# Patient Record
Sex: FEMALE | Race: WHITE | NOT HISPANIC OR LATINO
[De-identification: names, ages, dates, MRNs, and addresses within clinical notes are randomized per-mention and may not be internally consistent; named-entity substitution may affect disease eponyms.]

---

## 2018-07-19 ENCOUNTER — RESULT REVIEW (OUTPATIENT)
Age: 23
End: 2018-07-19

## 2019-08-01 ENCOUNTER — RESULT REVIEW (OUTPATIENT)
Age: 24
End: 2019-08-01

## 2020-12-03 PROBLEM — Z00.00 ENCOUNTER FOR PREVENTIVE HEALTH EXAMINATION: Status: ACTIVE | Noted: 2020-12-03

## 2020-12-21 ENCOUNTER — APPOINTMENT (OUTPATIENT)
Dept: ORTHOPEDIC SURGERY | Facility: CLINIC | Age: 25
End: 2020-12-21

## 2020-12-22 ENCOUNTER — APPOINTMENT (OUTPATIENT)
Dept: ORTHOPEDIC SURGERY | Facility: CLINIC | Age: 25
End: 2020-12-22
Payer: COMMERCIAL

## 2020-12-22 RX ORDER — CYCLOBENZAPRINE HYDROCHLORIDE 10 MG/1
10 TABLET, FILM COATED ORAL
Qty: 20 | Refills: 0 | Status: ACTIVE | COMMUNITY
Start: 2020-10-01

## 2020-12-22 RX ORDER — ETONOGESTREL AND ETHINYL ESTRADIOL 11.7; 2.7 MG/1; MG/1
0.12-0.015 INSERT, EXTENDED RELEASE VAGINAL
Qty: 3 | Refills: 0 | Status: ACTIVE | COMMUNITY
Start: 2020-11-12

## 2020-12-29 ENCOUNTER — APPOINTMENT (OUTPATIENT)
Dept: ORTHOPEDIC SURGERY | Facility: CLINIC | Age: 25
End: 2020-12-29
Payer: COMMERCIAL

## 2020-12-29 VITALS
WEIGHT: 108 LBS | SYSTOLIC BLOOD PRESSURE: 114 MMHG | DIASTOLIC BLOOD PRESSURE: 70 MMHG | HEART RATE: 103 BPM | HEIGHT: 62 IN | BODY MASS INDEX: 19.88 KG/M2

## 2020-12-29 VITALS — TEMPERATURE: 96 F

## 2020-12-29 DIAGNOSIS — M54.16 RADICULOPATHY, LUMBAR REGION: ICD-10-CM

## 2020-12-29 PROCEDURE — 99204 OFFICE O/P NEW MOD 45 MIN: CPT

## 2020-12-29 PROCEDURE — 99072 ADDL SUPL MATRL&STAF TM PHE: CPT

## 2020-12-29 RX ORDER — MELOXICAM 15 MG/1
15 TABLET ORAL
Qty: 30 | Refills: 1 | Status: ACTIVE | COMMUNITY
Start: 2020-12-29 | End: 1900-01-01

## 2021-10-15 ENCOUNTER — NON-APPOINTMENT (OUTPATIENT)
Age: 26
End: 2021-10-15

## 2021-10-15 ENCOUNTER — APPOINTMENT (OUTPATIENT)
Dept: ORTHOPEDIC SURGERY | Facility: CLINIC | Age: 26
End: 2021-10-15
Payer: COMMERCIAL

## 2021-10-15 VITALS — BODY MASS INDEX: 19.69 KG/M2 | HEIGHT: 62 IN | WEIGHT: 107 LBS

## 2021-10-15 DIAGNOSIS — Z78.9 OTHER SPECIFIED HEALTH STATUS: ICD-10-CM

## 2021-10-15 PROCEDURE — 99214 OFFICE O/P EST MOD 30 MIN: CPT

## 2021-10-15 NOTE — PHYSICAL EXAM
[de-identified] : General Exam\par \par Well developed, well nourished\par No apparent distress\par Oriented to person, place, and time\par Mood: Normal\par Affect: Normal\par Balance and coordination: Normal\par Gait: Normal\par \par Right hip exam\par \par Skin: Clean/dry and intact\par Inspection: No obvious deformity, no swelling, no ecchymosis.\par Tenderness:  no tenderness over greater trochanter/glut medius insertion. No tenderness pubic symphysis, pubic tubercle, hip flexors. No ttp ischial tuberosity or buttock. No ttp over the ASIS/Illiac crest.\par ROM: 0-120°. Internal rotation 30 external rotation 70\par Painful ROM: None\par Additional tests: No pain with circumduction positive impingement test at 90° mildly positive impingement test at 60° negative [facility]\par Strength: 5/5 hip flexion/ADD/ABD/Q/H/TA/GS/EHL\par Neuro: Sensation in tact to light touch throughout in dp/sp/tib/mega/saph distributions\par Pulses: 2+ DP/PT pulses\par  [de-identified] : Radiographs and MRI of the right hip obtained previously were reviewed.  There is a slight decrease in femoral head neck offset.  There is a focal detached tear of the anterior superior labrum

## 2021-10-15 NOTE — DISCUSSION/SUMMARY
[de-identified] : 26-year-old female with right hip femoral acetabular impingement and labral tear.  She is in extensive nonoperative treatment she continues.  Pain.  We discussed continued nonoperative treatment with activity modification physical therapy medications injections.  We discussed surgical treatment.  Right hip arthroscopy with labral repair possible femoroplasty.  We discussed the surgery postoperative protocol and restrictions at length.  Risks benefits alternatives discussed including but not limited to risks such as bleeding infection nerve and vessel injury continued pain stiffness need for future surgery medical complications such as DVT or PE and anesthesia complications all questions were answered and she will schedule at her convenience

## 2021-10-15 NOTE — HISTORY OF PRESENT ILLNESS
[de-identified] : 26-year-old female chief complaint of right hip pain for greater than 1 year she reports pain in her groin she tried a period of rest medications and physical therapy without relief she reports mechanical symptoms and popping within her joint she has pain with walking.  She is currently a medical student.  She has difficulty with standing for long periods of time because of pain in her groin\par \par The patient's past medical history, past surgical history, medications, allergies, and social history were reviewed by me today with the patient and documented accordingly. In addition, the patient's family history, which is noncontributory to this visit, was also reviewed.\par

## 2021-12-13 ENCOUNTER — TRANSCRIPTION ENCOUNTER (OUTPATIENT)
Age: 26
End: 2021-12-13

## 2021-12-13 ENCOUNTER — OUTPATIENT (OUTPATIENT)
Dept: OUTPATIENT SERVICES | Facility: HOSPITAL | Age: 26
LOS: 1 days | Discharge: HOME | End: 2021-12-13

## 2021-12-13 VITALS
SYSTOLIC BLOOD PRESSURE: 113 MMHG | HEIGHT: 62 IN | OXYGEN SATURATION: 98 % | TEMPERATURE: 98 F | DIASTOLIC BLOOD PRESSURE: 73 MMHG | HEART RATE: 83 BPM | WEIGHT: 108.47 LBS

## 2021-12-13 DIAGNOSIS — M25.851 OTHER SPECIFIED JOINT DISORDERS, RIGHT HIP: ICD-10-CM

## 2021-12-13 DIAGNOSIS — S73.191A OTHER SPRAIN OF RIGHT HIP, INITIAL ENCOUNTER: ICD-10-CM

## 2021-12-13 DIAGNOSIS — Z01.818 ENCOUNTER FOR OTHER PREPROCEDURAL EXAMINATION: ICD-10-CM

## 2021-12-13 LAB
ANION GAP SERPL CALC-SCNC: 6 MMOL/L — SIGNIFICANT CHANGE UP (ref 5–17)
BUN SERPL-MCNC: 8 MG/DL — SIGNIFICANT CHANGE UP (ref 7–23)
CALCIUM SERPL-MCNC: 9.2 MG/DL — SIGNIFICANT CHANGE UP (ref 8.5–10.1)
CHLORIDE SERPL-SCNC: 107 MMOL/L — SIGNIFICANT CHANGE UP (ref 96–108)
CO2 SERPL-SCNC: 30 MMOL/L — SIGNIFICANT CHANGE UP (ref 22–31)
CREAT SERPL-MCNC: 0.66 MG/DL — SIGNIFICANT CHANGE UP (ref 0.5–1.3)
GLUCOSE SERPL-MCNC: 81 MG/DL — SIGNIFICANT CHANGE UP (ref 70–99)
HCG UR QL: NEGATIVE — SIGNIFICANT CHANGE UP
HCT VFR BLD CALC: 42.7 % — SIGNIFICANT CHANGE UP (ref 34.5–45)
HGB BLD-MCNC: 13.9 G/DL — SIGNIFICANT CHANGE UP (ref 11.5–15.5)
MCHC RBC-ENTMCNC: 28.6 PG — SIGNIFICANT CHANGE UP (ref 27–34)
MCHC RBC-ENTMCNC: 32.6 GM/DL — SIGNIFICANT CHANGE UP (ref 32–36)
MCV RBC AUTO: 87.9 FL — SIGNIFICANT CHANGE UP (ref 80–100)
NRBC # BLD: 0 /100 WBCS — SIGNIFICANT CHANGE UP (ref 0–0)
PLATELET # BLD AUTO: 205 K/UL — SIGNIFICANT CHANGE UP (ref 150–400)
POTASSIUM SERPL-MCNC: 4 MMOL/L — SIGNIFICANT CHANGE UP (ref 3.5–5.3)
POTASSIUM SERPL-SCNC: 4 MMOL/L — SIGNIFICANT CHANGE UP (ref 3.5–5.3)
RBC # BLD: 4.86 M/UL — SIGNIFICANT CHANGE UP (ref 3.8–5.2)
RBC # FLD: 12.9 % — SIGNIFICANT CHANGE UP (ref 10.3–14.5)
SODIUM SERPL-SCNC: 143 MMOL/L — SIGNIFICANT CHANGE UP (ref 135–145)
WBC # BLD: 4.79 K/UL — SIGNIFICANT CHANGE UP (ref 3.8–10.5)
WBC # FLD AUTO: 4.79 K/UL — SIGNIFICANT CHANGE UP (ref 3.8–10.5)

## 2021-12-13 NOTE — H&P PST ADULT - EXTREMITIES
Alert to self only.  Confused and disoriented to time, place and situation.  Forgetful.  Received prn IV Metoprolol for HR >120.  Also on scheduled PO Metoprolol.  A fib with CVR.  Pt received one dose of PRN Metroprolol for HR greater than 120.  Pt denies any pain or sob.  IV SL.  Tolerating Regular;Lactose restricted diet.  Gets agitated at times.  Got prn PO Seroquel 12.5 mg for agitation and restlessness x1.  Also received one time dose of PO Seroquel 25 mg for MRI procedure.  Ambulated in hallways frequently.  Pt's daughter here for most of day shift and presently still here.  Sitter at bedside.  Will continue to monitor.     No cyanosis, clubbing or edema

## 2021-12-13 NOTE — H&P PST ADULT - ASSESSMENT
26 year old female with no significant past medical history c/o pain, numbness and tingling down right leg, with "popping" sensation.  She is scheduled for right hip arthroscopy labral repair possible femorplasty with image on 21.    CAPRINI SCORE [CLOT]    AGE RELATED RISK FACTORS                                                       MOBILITY RELATED FACTORS  [ ] Age 41-60 years                                            (1 Point)                  [ ] Bed rest                                                        (1 Point)  [ ] Age: 61-74 years                                           (2 Points)                 [ ] Plaster cast                                                   (2 Points)  [ ] Age= 75 years                                              (3 Points)                 [ ] Bed bound for more than 72 hours                 (2 Points)    DISEASE RELATED RISK FACTORS                                               GENDER SPECIFIC FACTORS  [ ] Edema in the lower extremities                       (1 Point)                  [ ] Pregnancy                                                     (1 Point)  [ ] Varicose veins                                               (1 Point)                  [ ] Post-partum < 6 weeks                                   (1 Point)             [ ] BMI > 25 Kg/m2                                            (1 Point)                  [ ] Hormonal therapy  or oral contraception          (1 Point)                 [ ] Sepsis (in the previous month)                        (1 Point)                  [ ] History of pregnancy complications                 (1 point)  [ ] Pneumonia or serious lung disease                                               [ ] Unexplained or recurrent                     (1 Point)           (in the previous month)                               (1 Point)  [ ] Abnormal pulmonary function test                     (1 Point)                 SURGERY RELATED RISK FACTORS  [ ] Acute myocardial infarction                              (1 Point)                 [ ]  Section                                             (1 Point)  [ ] Congestive heart failure (in the previous month)  (1 Point)               [ ] Minor surgery                                                  (1 Point)   [ ] Inflammatory bowel disease                             (1 Point)                 [x ] Arthroscopic surgery                                        (2 Points)  [ ] Central venous access                                      (2 Points)                [ ] General surgery lasting more than 45 minutes   (2 Points)       [ ] Stroke (in the previous month)                          (5 Points)               [ ] Elective arthroplasty                                         (5 Points)                                                                                                                                               HEMATOLOGY RELATED FACTORS                                                 TRAUMA RELATED RISK FACTORS  [ ] Prior episodes of VTE                                     (3 Points)                [ ] Fracture of the hip, pelvis, or leg                       (5 Points)  [ ] Positive family history for VTE                         (3 Points)                 [ ] Acute spinal cord injury (in the previous month)  (5 Points)  [ ] Prothrombin 74371 A                                     (3 Points)                 [ ] Paralysis  (less than 1 month)                             (5 Points)  [ ] Factor V Leiden                                             (3 Points)                  [ ] Multiple Trauma within 1 month                        (5 Points)  [ ] Lupus anticoagulants                                     (3 Points)                                                           [ ] Anticardiolipin antibodies                               (3 Points)                                                       [ ] High homocysteine in the blood                      (3 Points)                                             [ ] Other congenital or acquired thrombophilia      (3 Points)                                                [ ] Heparin induced thrombocytopenia                  (3 Points)                                          Total Score [    2     ]    Caprini Score 0 - 2:  Low Risk, No VTE Prophylaxis required for most patients, encourage ambulation  Caprini Score 3 - 6:  At Risk, pharmacologic VTE prophylaxis is indicated for most patients (in the absence of a contraindication)  Caprini Score Greater than or = 7:  High Risk, pharmacologic VTE prophylaxis is indicated for most patients (in the absence of a contraindication)

## 2021-12-13 NOTE — H&P PST ADULT - HISTORY OF PRESENT ILLNESS
26 year old female with no significant past medical history c/o pain, numbness and tingling down right leg, with "popping" sensation.  She is scheduled for right hip arthroscopy labral repair possible femorplasty with image on 12/20/21.    She denies fever, cough, SOB, recent travels, and sick contacts.

## 2021-12-13 NOTE — H&P PST ADULT - NS ABD PE RECTAL EXAM
have severe pain in your hands or feet.     · Normal color does not return to your hands or feet.     · Your hands or feet do not warm up even after home care.    Watch closely for changes in your health, and be sure to contact your doctor if you have any problems. Where can you learn more? Go to [email or web address] and sign in to your [employer] account. Enter P043 in the Q2ebanking box to learn more about \"[facility]: Care Instructions. \"     If you do not have an account, please click on the \"Sign Up Now\" link. Current as of: April 1, 2019  Content Version: 12.3  © 0147-9611 Healthwise, Incorporated. Care instructions adapted under license by [facility] ([facility]). If you have questions about a medical condition or this instruction, always ask your healthcare professional. [address] any warranty or liability for your use of this information.
not examined
I will SWITCH the dose or number of times a day I take the medications listed below when I get home from the hospital:    gabapentin 600 mg oral tablet  -- 1 tab(s) by mouth 3 times a day

## 2021-12-19 ENCOUNTER — TRANSCRIPTION ENCOUNTER (OUTPATIENT)
Age: 26
End: 2021-12-19

## 2021-12-20 ENCOUNTER — APPOINTMENT (OUTPATIENT)
Dept: ORTHOPEDIC SURGERY | Facility: HOSPITAL | Age: 26
End: 2021-12-20

## 2021-12-20 ENCOUNTER — RESULT REVIEW (OUTPATIENT)
Age: 26
End: 2021-12-20

## 2021-12-20 ENCOUNTER — OUTPATIENT (OUTPATIENT)
Dept: OUTPATIENT SERVICES | Facility: HOSPITAL | Age: 26
LOS: 1 days | Discharge: HOME | End: 2021-12-20
Payer: COMMERCIAL

## 2021-12-20 VITALS
RESPIRATION RATE: 17 BRPM | HEIGHT: 62 IN | TEMPERATURE: 99 F | DIASTOLIC BLOOD PRESSURE: 86 MMHG | WEIGHT: 108.03 LBS | HEART RATE: 99 BPM | OXYGEN SATURATION: 99 % | SYSTOLIC BLOOD PRESSURE: 123 MMHG

## 2021-12-20 VITALS
SYSTOLIC BLOOD PRESSURE: 108 MMHG | OXYGEN SATURATION: 96 % | RESPIRATION RATE: 15 BRPM | HEART RATE: 82 BPM | DIASTOLIC BLOOD PRESSURE: 59 MMHG

## 2021-12-20 LAB — HCG UR QL: NEGATIVE — SIGNIFICANT CHANGE UP

## 2021-12-20 PROCEDURE — 29914 HIP ARTHRO W/FEMOROPLASTY: CPT | Mod: RT

## 2021-12-20 PROCEDURE — 76000 FLUOROSCOPY <1 HR PHYS/QHP: CPT | Mod: 26

## 2021-12-20 PROCEDURE — 29916 HIP ARTHRO W/LABRAL REPAIR: CPT | Mod: RT

## 2021-12-20 RX ORDER — ONDANSETRON 8 MG/1
4 TABLET, FILM COATED ORAL ONCE
Refills: 0 | Status: DISCONTINUED | OUTPATIENT
Start: 2021-12-20 | End: 2021-12-20

## 2021-12-20 RX ORDER — SODIUM CHLORIDE 9 MG/ML
3 INJECTION INTRAMUSCULAR; INTRAVENOUS; SUBCUTANEOUS EVERY 8 HOURS
Refills: 0 | Status: DISCONTINUED | OUTPATIENT
Start: 2021-12-20 | End: 2021-12-20

## 2021-12-20 RX ORDER — ASPIRIN/CALCIUM CARB/MAGNESIUM 324 MG
1 TABLET ORAL
Qty: 30 | Refills: 0
Start: 2021-12-20 | End: 2022-01-18

## 2021-12-20 RX ORDER — HYDROMORPHONE HYDROCHLORIDE 2 MG/ML
1 INJECTION INTRAMUSCULAR; INTRAVENOUS; SUBCUTANEOUS
Refills: 0 | Status: DISCONTINUED | OUTPATIENT
Start: 2021-12-20 | End: 2021-12-20

## 2021-12-20 RX ORDER — OXYCODONE HYDROCHLORIDE 5 MG/1
1 TABLET ORAL
Qty: 30 | Refills: 0
Start: 2021-12-20 | End: 2021-12-24

## 2021-12-20 RX ORDER — DOCUSATE SODIUM 100 MG
1 CAPSULE ORAL
Qty: 60 | Refills: 0
Start: 2021-12-20 | End: 2022-01-18

## 2021-12-20 RX ORDER — SODIUM CHLORIDE 9 MG/ML
1000 INJECTION, SOLUTION INTRAVENOUS
Refills: 0 | Status: DISCONTINUED | OUTPATIENT
Start: 2021-12-20 | End: 2021-12-20

## 2021-12-20 RX ORDER — HYDROMORPHONE HYDROCHLORIDE 2 MG/ML
0.5 INJECTION INTRAMUSCULAR; INTRAVENOUS; SUBCUTANEOUS
Refills: 0 | Status: DISCONTINUED | OUTPATIENT
Start: 2021-12-20 | End: 2021-12-20

## 2021-12-20 RX ORDER — ONDANSETRON 8 MG/1
1 TABLET, FILM COATED ORAL
Qty: 20 | Refills: 0
Start: 2021-12-20 | End: 2021-12-24

## 2021-12-20 NOTE — ASU PATIENT PROFILE, ADULT - FALL HARM RISK - HARM RISK INTERVENTIONS
Communicate Risk of Fall with Harm to all staff/Monitor gait and stability/Reinforce activity limits and safety measures with patient and family/Tailored Fall Risk Interventions/Visual Cue: Yellow wristband and red socks/Bed in lowest position, wheels locked, appropriate side rails in place/Call bell, personal items and telephone in reach/Instruct patient to call for assistance before getting out of bed or chair/Non-slip footwear when patient is out of bed/[place] to call system/Physically safe environment - no spills, clutter or unnecessary equipment/Purposeful Proactive Rounding/Room/bathroom lighting operational, light cord in reach

## 2021-12-20 NOTE — ASU DISCHARGE PLAN (ADULT/PEDIATRIC) - NS MD DC FALL RISK RISK
For information on Fall & Injury Prevention, visit: [email or web address] OR  [email or web address] OR  https://www.cdc.gov/steadi/patient.html

## 2021-12-20 NOTE — ASU DISCHARGE PLAN (ADULT/PEDIATRIC) - ASU DC SPECIAL INSTRUCTIONSFT
Follow instructions given in post surgery packet. Follow instructions given in post surgery packet.  Partial weight bearing right lower extremity, use crutches for help with ambulation.  Take aspirin 325mg once daily for one month  Take Naprosyn 500mg twice per day for 4 weeks.  Follow up with Dr Vasquez in 2 weeks.

## 2021-12-20 NOTE — ASU DISCHARGE PLAN (ADULT/PEDIATRIC) - CARE PROVIDER_API CALL
Patrick Vasquez)  Orthopaedic Surgery  [address], [address]  [place] [address]  Phone: [phone]  Fax: [phone]  Follow Up Time:

## 2021-12-26 DIAGNOSIS — Z79.899 OTHER LONG TERM (CURRENT) DRUG THERAPY: ICD-10-CM

## 2021-12-26 DIAGNOSIS — M24.151 OTHER ARTICULAR CARTILAGE DISORDERS, RIGHT HIP: ICD-10-CM

## 2021-12-26 DIAGNOSIS — M25.551 PAIN IN RIGHT HIP: ICD-10-CM

## 2021-12-26 DIAGNOSIS — M25.851 OTHER SPECIFIED JOINT DISORDERS, RIGHT HIP: ICD-10-CM

## 2021-12-30 NOTE — ASU PATIENT PROFILE, ADULT - FALL HARM RISK - FALL HARM RISK
Quality 111:Pneumonia Vaccination Status For Older Adults: Pneumococcal Vaccination Previously Received Quality 130: Documentation Of Current Medications In The Medical Record: Current Medications Documented Detail Level: Detailed Quality 128: Preventive Care And Screening: Body Mass Index (Bmi) Screening And Follow-Up Plan: BMI is documented above normal parameters and a follow-up plan is documented Quality 402: Tobacco Use And Help With Quitting Among Adolescents: Patient screened for tobacco and never smoked Quality 431: Preventive Care And Screening: Unhealthy Alcohol Use - Screening: Patient not identified as an unhealthy alcohol user when screened for unhealthy alcohol use using a systematic screening method Quality 265: Biopsy Follow-Up: Biopsy results reviewed, communicated, tracked, and documented Quality 110: Preventive Care And Screening: Influenza Immunization: Influenza Immunization Administered during Influenza season Other

## 2022-01-05 ENCOUNTER — APPOINTMENT (OUTPATIENT)
Dept: ORTHOPEDIC SURGERY | Facility: CLINIC | Age: 27
End: 2022-01-05
Payer: COMMERCIAL

## 2022-01-05 VITALS — WEIGHT: 107 LBS | HEIGHT: 62 IN | BODY MASS INDEX: 19.69 KG/M2

## 2022-01-05 PROBLEM — Z78.9 OTHER SPECIFIED HEALTH STATUS: Chronic | Status: ACTIVE | Noted: 2021-12-13

## 2022-01-05 PROCEDURE — 99024 POSTOP FOLLOW-UP VISIT: CPT

## 2022-01-05 NOTE — HISTORY OF PRESENT ILLNESS
[de-identified] : R hip [de-identified] : 27yo F s/p Right hip arthroscopy, labral repair, femoroplasty, 12/20/21.\par She is doing well.  Her pain is controlled.  She has not started physical therapy.  She is using crutches. [de-identified] : Right hip exam\par Incisions well-healed no erythema\par No pain with hip range of motion 0-90° no pain with logroll\par Able to flex and extend all toes\par sensation intact throughout\par bcr.\par  [de-identified] : 27yo F s/p Right hip arthroscopy, labral repair, femoroplasty, 12/20/21.\par  [de-identified] : We reviewed postoperative protocol and restrictions.  We reviewed intraoperative photographs.  She is going to continue partial weightbearing with crutches she will start physical therapy.  Sutures were removed.  She will follow up in 1 month.  All questions answered

## 2022-02-02 ENCOUNTER — APPOINTMENT (OUTPATIENT)
Dept: ORTHOPEDIC SURGERY | Facility: CLINIC | Age: 27
End: 2022-02-02
Payer: COMMERCIAL

## 2022-02-02 VITALS — BODY MASS INDEX: 20.06 KG/M2 | WEIGHT: 109 LBS | HEIGHT: 62 IN

## 2022-02-02 PROCEDURE — 99024 POSTOP FOLLOW-UP VISIT: CPT

## 2022-02-02 NOTE — HISTORY OF PRESENT ILLNESS
[de-identified] : R hip [de-identified] : 27yo F s/p Right hip arthroscopy, labral repair, femoroplasty, 12/20/21.  She is doing well, working with PT.  Denies numbness/tingling. [de-identified] : Right hip exam\par Incisions well-healed no erythema\par No pain with hip range of motion 0-90° no pain with logroll\par Able to flex and extend all toes\par sensation intact throughout\par bcr.\par  [de-identified] : 27yo F s/p Right hip arthroscopy, labral repair, femoroplasty, 12/20/21.\par  [de-identified] : She is doing very well she is happy with her progress she reports minimal pain should continue with physical therapy home exercise program.  She will follow up in 6 weeks

## 2022-03-16 ENCOUNTER — APPOINTMENT (OUTPATIENT)
Dept: ORTHOPEDIC SURGERY | Facility: CLINIC | Age: 27
End: 2022-03-16
Payer: COMMERCIAL

## 2022-03-16 VITALS — BODY MASS INDEX: 20.24 KG/M2 | WEIGHT: 110 LBS | HEIGHT: 62 IN

## 2022-03-16 DIAGNOSIS — M25.851 OTHER SPECIFIED JOINT DISORDERS, RIGHT HIP: ICD-10-CM

## 2022-03-16 PROCEDURE — 99024 POSTOP FOLLOW-UP VISIT: CPT

## 2022-03-16 NOTE — HISTORY OF PRESENT ILLNESS
[de-identified] : R hip [de-identified] : 25yo F s/p Right hip arthroscopy, labral repair, femoroplasty, 12/20/21.  She is doing well, working with PT.  Denies numbness/tingling. [de-identified] : Right hip exam\par Incisions well-healed no erythema\par No pain with hip range of motion 0-90° no pain with logroll\par Able to flex and extend all toes\par sensation intact throughout\par bcr.\par  [de-identified] : 27yo F s/p Right hip arthroscopy, labral repair, femoroplasty, 12/20/21.\par  [de-identified] : She is overall doing well she will continue her therapy home exercise program she has been running without pain she will follow-up 3 months.  All questions answered

## 2022-06-29 ENCOUNTER — APPOINTMENT (OUTPATIENT)
Dept: ORTHOPEDIC SURGERY | Facility: CLINIC | Age: 27
End: 2022-06-29

## 2022-06-29 VITALS — WEIGHT: 110 LBS | BODY MASS INDEX: 20.24 KG/M2 | HEIGHT: 62 IN

## 2022-06-29 DIAGNOSIS — S73.191A OTHER SPRAIN OF RIGHT HIP, INITIAL ENCOUNTER: ICD-10-CM

## 2022-06-29 PROCEDURE — 99213 OFFICE O/P EST LOW 20 MIN: CPT

## 2022-06-29 PROCEDURE — 99024 POSTOP FOLLOW-UP VISIT: CPT

## 2022-06-29 NOTE — HISTORY OF PRESENT ILLNESS
[de-identified] : 28yo F s/p Right hip arthroscopy, labral repair, femoroplasty, 12/20/21.  She is overall doing well.  She is returned to all activities reports minimal occasional soreness.  She is happy with her progress

## 2022-06-29 NOTE — PHYSICAL EXAM
[de-identified] : Right hip exam\par \par Skin: Clean/dry and intact\par Inspection: No obvious deformity, no swelling, no ecchymosis.\par Tenderness:  no tenderness over greater trochanter/glut medius insertion. No tenderness pubic symphysis, pubic tubercle, hip flexors. No ttp ischial tuberosity or buttock. No ttp over the ASIS/Illiac crest.\par ROM: 0-120°. Internal rotation 30 external rotation 70\par Painful ROM: None\par Additional tests: No pain with circumduction negative impingement test at 90° mildly positive impingement test at 60° negative [facility]\par Strength: 5/5 hip flexion/ADD/ABD/Q/H/TA/GS/EHL\par Neuro: Sensation in tact to light touch throughout in dp/sp/tib/mega/saph distributions\par Pulses: 2+ DP/PT pulses

## 2022-06-29 NOTE — DISCUSSION/SUMMARY
[de-identified] : She is doing well at this time.  She has full range of motion minimal discomfort excellent strength.  She is cleared to return to activities as tolerated.  She may follow-up as needed.  All questions answered

## 2023-01-27 ENCOUNTER — APPOINTMENT (OUTPATIENT)
Dept: ORTHOPEDIC SURGERY | Facility: CLINIC | Age: 28
End: 2023-01-27
Payer: COMMERCIAL

## 2023-01-27 VITALS — WEIGHT: 110 LBS | BODY MASS INDEX: 20.24 KG/M2 | HEIGHT: 62 IN

## 2023-01-27 DIAGNOSIS — M67.431 GANGLION, RIGHT WRIST: ICD-10-CM

## 2023-01-27 DIAGNOSIS — M65.831 OTHER SYNOVITIS AND TENOSYNOVITIS, RIGHT FOREARM: ICD-10-CM

## 2023-01-27 PROCEDURE — 99203 OFFICE O/P NEW LOW 30 MIN: CPT | Mod: 25

## 2023-01-27 PROCEDURE — J3490M: CUSTOM

## 2023-01-27 PROCEDURE — 20605 DRAIN/INJ JOINT/BURSA W/O US: CPT | Mod: RT

## 2023-01-27 NOTE — HISTORY OF PRESENT ILLNESS
[Gradual] : gradual [4] : 4 [2] : 2 [Dull/Aching] : dull/aching [Nothing helps with pain getting better] : Nothing helps with pain getting better [de-identified] : R wrist ganglion cyst\par Started a month ago with yoga\par \par +US\par \par  [] : no [FreeTextEntry1] :  right wrist  [FreeTextEntry3] : 12/2022 [FreeTextEntry5] : she has discomfort and a bump, no injury  [de-identified] : activity  [de-identified] : 01/05/23 [de-identified] : PCP  [de-identified] : XR,US

## 2023-01-27 NOTE — ASSESSMENT
[FreeTextEntry1] : R Wrist injection was performed because of pain inflammation and stiffness\par Anesthesia: ethyl chloride sprayed topically\par Celestone: An injection of Celestone 1cc\par Lidocaine: An injection of Lidocaine 1% 1cc\par Marcaine: An injection of Marcaine 0.5% 1cc\par \par Patient has tried OTC's including aspirin, Ibuprofen, Aleve etc or prescription NSAIDS, and/or exercises at home and/ or\par physical therapy without satisfactory response.\par After verbal consent using sterile preparation and technique. The risks, benefits, and alternatives to cortisone injection\par were explained in full to the patient. Risks outlined include but are not limited to infection, sepsis, bleeding, scarring, skin\par discoloration, temporary increase in pain, syncopal episode, failure to resolve symptoms, allergic reaction, symptom\par recurrence, and elevation of blood sugar in diabetics. Patient understood the risks. All questions were answered. After\par discussion of options, patient requested an injection. Oral informed consent was obtained and sterile prep was done of the\par injection site. Sterile technique was utilized for the procedure including the preparation of the solutions used for the\par injection. Patient tolerated the procedure well. Advised to ice the injection site this evening.\par Prep with betadine locally to site. Sterile technique used

## 2023-08-03 ENCOUNTER — OUTPATIENT (OUTPATIENT)
Dept: OUTPATIENT SERVICES | Facility: HOSPITAL | Age: 28
LOS: 1 days | Discharge: HOME | End: 2023-08-03

## 2023-08-23 DIAGNOSIS — F32.A DEPRESSION, UNSPECIFIED: ICD-10-CM

## 2023-08-23 DIAGNOSIS — F40.10 SOCIAL PHOBIA, UNSPECIFIED: ICD-10-CM

## 2025-03-27 NOTE — ASU PREOP CHECKLIST - BOWEL PREP
Yesica Rodriguez is a 66 year old female presenting for   Chief Complaint   Patient presents with    Physical     Denies Latex allergy or sensitivity.    Medication verified and med list updated  Refills needed today: No       Health Maintenance       Shingles Vaccine (1 of 2)  Never done    Respiratory Syncytial Virus (RSV) Vaccine 60+ (1 - Risk 60-74 years 1-dose series)  Never done    Lung Cancer Screening (Yearly)  Overdue since 4/12/2022    Breast Cancer Screening (Every 2 Years)  Overdue since 5/21/2024    COVID-19 Vaccine (1 - 2024-25 season)  Never done           Following review of the above:  Patient wishes to discuss with clinician: Lung Cancer Screening and Mammogram    Note: Refer to final orders and clinician documentation.                                Last lab results:   Hemoglobin A1C (%)   Date Value   10/27/2017 5.6     Cholesterol (mg/dL)   Date Value   03/20/2021 223 (H)     HDL (mg/dL)   Date Value   03/20/2021 95     Triglycerides (mg/dL)   Date Value   03/20/2021 52     LDL (mg/dL)   Date Value   03/20/2021 118     No results found for: \"URMIC\", \"UCR\", \"MALBCR\"  No results found for: \"IFOB\"                 Depression Screening:  Review Flowsheet  More data exists         2/12/2025   PHQ 2/9 Score   Adult PHQ 2 Score 6   Adult PHQ 2 Interpretation Further screening needed   Little interest or pleasure in activity? Nearly every day   Feeling down, depressed or hopeless? Nearly every day   Adult PHQ 9 Score 16   Adult PHQ 9 Interpretation Moderately Severe Depression   Trouble falling or staying asleep or sleeping all the time? More than half the days   Feeling tired or having little energy? More than half the days   Poor appetite or overeating? Nearly every day   Feeling bad about yourself or that you are a failure or have let yourself or family down? Not at all   Trouble concentrating on things such as reading the newspaper or watching TV? Nearly every day   Moving or speaking slowly that  other people have noticed or the opposite - being so fidgety or restless that you have been moving around a lot more than usual? Not at all   Thoughts that you would be better off dead or of hurting yourself in some way? Not at all   If you reported any problems, how difficult have these problems made it to do your work, take care of things at home, or get along with other people? Very difficult      Details                    Advance Directives:  not discussed   n/a